# Patient Record
Sex: FEMALE | Race: WHITE
[De-identification: names, ages, dates, MRNs, and addresses within clinical notes are randomized per-mention and may not be internally consistent; named-entity substitution may affect disease eponyms.]

---

## 2019-01-17 ENCOUNTER — RESULT REVIEW (OUTPATIENT)
Age: 39
End: 2019-01-17

## 2020-01-21 ENCOUNTER — RESULT REVIEW (OUTPATIENT)
Age: 40
End: 2020-01-21

## 2021-05-17 ENCOUNTER — RESULT REVIEW (OUTPATIENT)
Age: 41
End: 2021-05-17

## 2022-04-12 ENCOUNTER — APPOINTMENT (OUTPATIENT)
Dept: ORTHOPEDIC SURGERY | Facility: CLINIC | Age: 42
End: 2022-04-12
Payer: COMMERCIAL

## 2022-04-12 VITALS — BODY MASS INDEX: 27.82 KG/M2 | HEIGHT: 63 IN | WEIGHT: 157 LBS

## 2022-04-12 DIAGNOSIS — Z78.9 OTHER SPECIFIED HEALTH STATUS: ICD-10-CM

## 2022-04-12 DIAGNOSIS — M25.519 PAIN IN UNSPECIFIED SHOULDER: ICD-10-CM

## 2022-04-12 DIAGNOSIS — M75.32 CALCIFIC TENDINITIS OF LEFT SHOULDER: ICD-10-CM

## 2022-04-12 PROBLEM — Z00.00 ENCOUNTER FOR PREVENTIVE HEALTH EXAMINATION: Status: ACTIVE | Noted: 2022-04-12

## 2022-04-12 PROCEDURE — 99213 OFFICE O/P EST LOW 20 MIN: CPT

## 2022-04-12 PROCEDURE — 99203 OFFICE O/P NEW LOW 30 MIN: CPT

## 2022-04-15 NOTE — HISTORY OF PRESENT ILLNESS
[1] : 2 [0] : 0 [Full time] : Work status: full time [de-identified] : 4/12/22: f/u L shoulder, good response to csi last visit. Pain for a couple days post injection then improved 9 days after. She has been doing PT which is helpful. Her pain persists. \par \par 2/22/22: 42 yo LHD F () presents with progressive severity pain, clicking and decreased motion over the past 1-2 months. No injury recalled. Feels very stiff. She has difficulty raising her arm to get dressed, wash her hair and perform adl's. Nsaids have not been helpful. She has been having her  massage her shoulder. No n/t.\par \par Prior hx similar symptoms L shoulder 2 years ago - improved with PT.\par  [FreeTextEntry1] : lt shoulder [de-identified] : p/t

## 2022-04-15 NOTE — PHYSICAL EXAM
[Left] : left shoulder [Sitting] : sitting [4 ___] : forward flexion 4[unfilled]/5 [4___] : external rotation 4[unfilled]/5 [] : discomfort with strength testing [FreeTextEntry9] : pain and motion improved [TWNoteComboBox7] : active forward flexion 150 degrees [de-identified] : external rotation 45 degrees

## 2022-04-15 NOTE — DISCUSSION/SUMMARY
[Medication Risks Reviewed] : Medication risks reviewed [de-identified] : I have personally reviewed all previous images as well as imaging reports.  I have reviewed any previous medical records including physical therapy reports, and reports from referring physicians.  We have come to a probable diagnosis.\par \par The patient was explained the diagnosis and treatment options for the condition in great detail.  We discussed the use of injectable as well as oral medications and steroids, physical therapy, oral and topical anti-inflammatories other modalities including surgical treatment.  \par \par After reviewing all clinical information provided as well as the available imaging studies, I recommended activity modification and avoidance conditions leading to pain.  We discussed the possibility of prescription strength anti-inflammatory medications as well as use of over-the-counter medications.  We also discussed surgical options to treat this problem.\par  \par All of the patient's questions were answered to Her satisfaction. Diagnoses and potential treatments were reviewed. She agreed with the plan and would like to move forward with it.

## 2022-10-17 ENCOUNTER — APPOINTMENT (OUTPATIENT)
Dept: ORTHOPEDIC SURGERY | Facility: CLINIC | Age: 42
End: 2022-10-17

## 2023-03-27 ENCOUNTER — APPOINTMENT (OUTPATIENT)
Dept: ORTHOPEDIC SURGERY | Facility: CLINIC | Age: 43
End: 2023-03-27
Payer: COMMERCIAL

## 2023-03-27 VITALS — BODY MASS INDEX: 27.82 KG/M2 | HEIGHT: 63 IN | WEIGHT: 157 LBS

## 2023-03-27 DIAGNOSIS — M77.12 LATERAL EPICONDYLITIS, LEFT ELBOW: ICD-10-CM

## 2023-03-27 PROCEDURE — 99213 OFFICE O/P EST LOW 20 MIN: CPT

## 2023-03-27 NOTE — HISTORY OF PRESENT ILLNESS
[Gradual] : gradual [4] : 4 [3] : 3 [Sharp] : sharp [Frequent] : frequent [Sleep] : sleep [Rest] : rest [Meds] : meds [de-identified] : 42 year old male presenting with LEFT elbow pain after banging her elbow 2 months ago. She has had pain in the elbow since the bang.  [FreeTextEntry1] : left elbow [FreeTextEntry3] :  2 months ago [FreeTextEntry5] : hit her elbow to the cabinet  [de-identified] : flexing, certain movements

## 2023-03-27 NOTE — DISCUSSION/SUMMARY
[de-identified] : Discussed the nature of the diagnosis and risk and benefits of different modalities of treatment.\par Discussed the likelihood of symptoms resolving with time. \par Discussed a modified lifting technique.\par Start PT and use of tennis elbow strap.\par All questions answered. \par RTO 4 weeks.\par

## 2023-04-24 ENCOUNTER — APPOINTMENT (OUTPATIENT)
Dept: ORTHOPEDIC SURGERY | Facility: CLINIC | Age: 43
End: 2023-04-24

## 2024-09-21 ENCOUNTER — NON-APPOINTMENT (OUTPATIENT)
Age: 44
End: 2024-09-21